# Patient Record
(demographics unavailable — no encounter records)

---

## 2024-11-27 NOTE — PHYSICAL EXAM
[Normal Appearance] : normal appearance [Edema] : no peripheral edema [] : no respiratory distress [Abdomen Soft] : soft [Abdomen Tenderness] : non-tender [Abdomen Mass (___ Cm)] : no abdominal mass palpated [Costovertebral Angle Tenderness] : no ~M costovertebral angle tenderness [Urethral Meatus] : meatus normal [Penis Abnormality] : normal uncircumcised penis [Urinary Bladder Findings] : the bladder was normal on palpation [Scrotum] : the scrotum was normal [Epididymis] : the epididymides were normal [Testes Tenderness] : no tenderness of the testes [Testes Mass (___cm)] : there were no testicular masses [Normal Station and Gait] : the gait and station were normal for the patient's age [Skin Turgor] : supple [No Focal Deficits] : no focal deficits [Oriented To Time, Place, And Person] : oriented to person, place, and time [Affect] : the affect was normal [Mood] : the mood was normal [de-identified] : Lare and stable RIH which empties in the supine position.

## 2024-11-27 NOTE — HISTORY OF PRESENT ILLNESS
[FreeTextEntry1] : 83 YO M seen  8/24/2023 as NPT. Patient seen 5/8/2028 ([facility]) for BPH on Finasteride. PSA 0.1. He has minimal LUTS except for nocturia x 3. He remains on finasteride 5 mg daily. He noted grossly bloody urine on one occasion one month ago. He denies any dysuria, kidney stone disease or UTIs. UA:  moderate WBC, PRO 30, Uro 2 IPSS 5 10+ pack year history of smoking but quit in 1999. Patient is also on amlodipine, metoprolol, Losartan for HTN, Metformin for DM, Lipitor for cholesterol. Allergic to PCN. Pelvic US: Bladder wall: was of normal thickness without diverticula Post void bladder residual: 29 cc Prostate gland measured: 3.5 cm x 2.6 cm x 3.6 m Prostate gland volume: 17 cc Impressions: Technically difficult study due to patient body habitus and sub optimally filled bladder, otherwise exam is unremarkable. We discussed the patient's history of Gross Hematuria and past history of smoking. I sent urine today for C+S and cytology, blood for PSA and BMP. If these confirm normal real function, then we will proceed with CT A+P, C+/C-, followed by cystoscopy in the office with NO inhalation. As for the RIH, it is asymptomatic. We discussed signs and symptoms of incarceration and how to proceed emergently if they should occur.  PSA 0.1 (equivalent to 0.2 on Finasteride) C+S positive treated with Bactrim DS x 5 days Cytology negative CT scan 9/13/2023: No stones, no hydronephrosis, stable cysts, stable 1 cm exophytic solid left renal mass, recommend MRI 1 year, large RIH.  Patient seen 10/5/2023 for cystoscopy. He denies any interval gross hematuria. UA small WBC only. Cystoscopy today: No bladder lesions, small, non obstructing prostate, multiple urethral strictures, narrowest in bulb 14Fr dilated with the scope to 18 Fr.  Cystoscopy today did not reveal any bleeding sites in the lower urinary tract. The only abnormality were multiple strictures in the anterior urethra starting from the bulb and proceeding distally. The most narrow 1 measured approximately 14 Syriac in the bulb and was dilated gently with the scope to approximately 18 Syriac. Patient tolerated this well. We will have the patient come back in 6 weeks to perform uroflowmetry and PVR. In meanwhile he will stay on the finasteride. UA today showed small white blood cells negative nitrites and urine culture was sent to follow-up on his previously treated bacterial infection. At patient's request we also discussed again his large right inguinal hernia confirmed on his recent CT scan. He is concerned about potential incarceration and I recommended that he follow-up with his PCP Dr. Bryce Garcia office for referral to the appropriate surgeon for evaluation and possible surgical repair. We again reviewed the signs of incarceration and how to proceed in the unlikely event that that should occur before he is evaluated and treated.  Patient seen TODAY 11/27/2024 to reassess. He told me that he urinating well with stable nocturia x 3 on finasteride only. He gets this through his PCP. He denies any gross hematuria. He also C/O intermittent pain in the testes and lower abdomen which is fleeting and occurs once every 2 weeks. He denies any N/V or fever. He also C/O ED with difficulty achieving a full erection. In the past he has used Viagra 100+ mg with success. He is presently also on multi drug therapy for his HTN.  No new medication allergies, only PCN as previously noted.  UA large RBC IPSS 4 Uroflow inaccurate: Vol 58ml, PVR 0 ml.

## 2024-11-27 NOTE — HISTORY OF PRESENT ILLNESS
[FreeTextEntry1] : 83 YO M seen  8/24/2023 as NPT. Patient seen 5/8/2028 ([facility]) for BPH on Finasteride. PSA 0.1. He has minimal LUTS except for nocturia x 3. He remains on finasteride 5 mg daily. He noted grossly bloody urine on one occasion one month ago. He denies any dysuria, kidney stone disease or UTIs. UA:  moderate WBC, PRO 30, Uro 2 IPSS 5 10+ pack year history of smoking but quit in 1999. Patient is also on amlodipine, metoprolol, Losartan for HTN, Metformin for DM, Lipitor for cholesterol. Allergic to PCN. Pelvic US: Bladder wall: was of normal thickness without diverticula Post void bladder residual: 29 cc Prostate gland measured: 3.5 cm x 2.6 cm x 3.6 m Prostate gland volume: 17 cc Impressions: Technically difficult study due to patient body habitus and sub optimally filled bladder, otherwise exam is unremarkable. We discussed the patient's history of Gross Hematuria and past history of smoking. I sent urine today for C+S and cytology, blood for PSA and BMP. If these confirm normal real function, then we will proceed with CT A+P, C+/C-, followed by cystoscopy in the office with NO inhalation. As for the RIH, it is asymptomatic. We discussed signs and symptoms of incarceration and how to proceed emergently if they should occur.  PSA 0.1 (equivalent to 0.2 on Finasteride) C+S positive treated with Bactrim DS x 5 days Cytology negative CT scan 9/13/2023: No stones, no hydronephrosis, stable cysts, stable 1 cm exophytic solid left renal mass, recommend MRI 1 year, large RIH.  Patient seen 10/5/2023 for cystoscopy. He denies any interval gross hematuria. UA small WBC only. Cystoscopy today: No bladder lesions, small, non obstructing prostate, multiple urethral strictures, narrowest in bulb 14Fr dilated with the scope to 18 Fr.  Cystoscopy today did not reveal any bleeding sites in the lower urinary tract. The only abnormality were multiple strictures in the anterior urethra starting from the bulb and proceeding distally. The most narrow 1 measured approximately 14 Icelandic in the bulb and was dilated gently with the scope to approximately 18 Icelandic. Patient tolerated this well. We will have the patient come back in 6 weeks to perform uroflowmetry and PVR. In meanwhile he will stay on the finasteride. UA today showed small white blood cells negative nitrites and urine culture was sent to follow-up on his previously treated bacterial infection. At patient's request we also discussed again his large right inguinal hernia confirmed on his recent CT scan. He is concerned about potential incarceration and I recommended that he follow-up with his PCP Dr. Bryce Garcia office for referral to the appropriate surgeon for evaluation and possible surgical repair. We again reviewed the signs of incarceration and how to proceed in the unlikely event that that should occur before he is evaluated and treated.  Patient seen TODAY 11/27/2024 to reassess. He told me that he urinating well with stable nocturia x 3 on finasteride only. He gets this through his PCP. He denies any gross hematuria. He also C/O intermittent pain in the testes and lower abdomen which is fleeting and occurs once every 2 weeks. He denies any N/V or fever. He also C/O ED with difficulty achieving a full erection. In the past he has used Viagra 100+ mg with success. He is presently also on multi drug therapy for his HTN.  No new medication allergies, only PCN as previously noted.  UA large RBC IPSS 4 Uroflow inaccurate: Vol 58ml, PVR 0 ml.

## 2024-11-27 NOTE — ASSESSMENT
[FreeTextEntry1] : Evaluation today consists of stable lower urinary tract symptoms which are tolerable on finasteride.  I recommended he continue on this medication.  As for the previous gross hematuria, this has not recurred, but he does continue to have microscopic hematuria.  Urine sent for culture and cytology.  If these test remain negative, then follow-up on next visit.  We made plans for him to follow-up with me in 6 months with a repeat uroflow and PVR.  As for the testicular pain, physical examination today was normal and did not elicit the testicular pain.  We will continue to observe this at present.  As for the right inguinal hernia, this is a known problem, and we again discussed evaluation for possible treatment.  I also discussed with him signs and symptoms of incarceration and what he should do if they occur.  He is aware.  Since this hernia is minimally symptomatic at this time, he will continue to observe.  As for the ED, we discussed the use of sildenafil citrate starting at 100 mg per dose along with the indications risks chances for success and possible side effects.  He would like to try the medicine, and this was ordered for him through [facility] at his request. Rufina Dillon MD

## 2024-11-27 NOTE — PHYSICAL EXAM
[Normal Appearance] : normal appearance [Edema] : no peripheral edema [] : no respiratory distress [Abdomen Soft] : soft [Abdomen Tenderness] : non-tender [Abdomen Mass (___ Cm)] : no abdominal mass palpated [Costovertebral Angle Tenderness] : no ~M costovertebral angle tenderness [Urethral Meatus] : meatus normal [Penis Abnormality] : normal uncircumcised penis [Urinary Bladder Findings] : the bladder was normal on palpation [Scrotum] : the scrotum was normal [Epididymis] : the epididymides were normal [Testes Tenderness] : no tenderness of the testes [Testes Mass (___cm)] : there were no testicular masses [Normal Station and Gait] : the gait and station were normal for the patient's age [Skin Turgor] : supple [No Focal Deficits] : no focal deficits [Oriented To Time, Place, And Person] : oriented to person, place, and time [Affect] : the affect was normal [Mood] : the mood was normal [de-identified] : Lare and stable RIH which empties in the supine position.

## 2025-06-11 NOTE — HISTORY OF PRESENT ILLNESS
[FreeTextEntry1] : 83 YO M seen  8/24/2023 as NPT. Patient seen 5/8/2028 ([facility]) for BPH on Finasteride. PSA 0.1. He has minimal LUTS except for nocturia x 3. He remains on finasteride 5 mg daily. He noted grossly bloody urine on one occasion one month ago. He denies any dysuria, kidney stone disease or UTIs. UA:  moderate WBC, PRO 30, Uro 2 IPSS 5 10+ pack year history of smoking but quit in 1999. Patient is also on amlodipine, metoprolol, Losartan for HTN, Metformin for DM, Lipitor for cholesterol. Allergic to PCN. Pelvic US: Bladder wall: was of normal thickness without diverticula Post void bladder residual: 29 cc Prostate gland measured: 3.5 cm x 2.6 cm x 3.6 m Prostate gland volume: 17 cc Impressions: Technically difficult study due to patient body habitus and sub optimally filled bladder, otherwise exam is unremarkable. We discussed the patient's history of Gross Hematuria and past history of smoking. I sent urine today for C+S and cytology, blood for PSA and BMP. If these confirm normal real function, then we will proceed with CT A+P, C+/C-, followed by cystoscopy in the office with NO inhalation. As for the RIH, it is asymptomatic. We discussed signs and symptoms of incarceration and how to proceed emergently if they should occur.  PSA 0.1 (equivalent to 0.2 on Finasteride) C+S positive treated with Bactrim DS x 5 days Cytology negative CT scan 9/13/2023: No stones, no hydronephrosis, stable cysts, stable 1 cm exophytic solid left renal mass, recommend MRI 1 year, large RIH.  Patient seen 10/5/2023 for cystoscopy. He denies any interval gross hematuria. UA small WBC only. Cystoscopy today: No bladder lesions, small, non obstructing prostate, multiple urethral strictures, narrowest in bulb 14Fr dilated with the scope to 18 Fr.  Cystoscopy today did not reveal any bleeding sites in the lower urinary tract. The only abnormality were multiple strictures in the anterior urethra starting from the bulb and proceeding distally. The most narrow 1 measured approximately 14 Welsh in the bulb and was dilated gently with the scope to approximately 18 Welsh. Patient tolerated this well. We will have the patient come back in 6 weeks to perform uroflowmetry and PVR. In meanwhile he will stay on the finasteride. UA today showed small white blood cells negative nitrites and urine culture was sent to follow-up on his previously treated bacterial infection. At patient's request we also discussed again his large right inguinal hernia confirmed on his recent CT scan. He is concerned about potential incarceration and I recommended that he follow-up with his PCP Dr. Bryce Garcia office for referral to the appropriate surgeon for evaluation and possible surgical repair. We again reviewed the signs of incarceration and how to proceed in the unlikely event that that should occur before he is evaluated and treated.  Patient seen 11/27/2024 to reassess. He told me that he urinating well with stable nocturia x 3 on finasteride only. He gets this through his PCP. He denies any gross hematuria. He also C/O intermittent pain in the testes and lower abdomen which is fleeting and occurs once every 2 weeks. He denies any N/V or fever. He also C/O ED with difficulty achieving a full erection. In the past he has used Viagra 100+ mg with success. He is presently also on multi drug therapy for his HTN.  No new medication allergies, only PCN as previously noted.  UA large RBC IPSS 4 Uroflow inaccurate: Vol 58ml, PVR 0 ml. Evaluation today consists of stable lower urinary tract symptoms which are tolerable on finasteride. I recommended he continue on this medication. As for the previous gross hematuria, this has not recurred, but he does continue to have microscopic hematuria. Urine sent for culture and cytology. If these test remain negative, then follow-up on next visit. We made plans for him to follow-up with me in 6 months with a repeat uroflow and PVR. As for the testicular pain, physical examination today was normal and did not elicit the testicular pain. We will continue to observe this at present. As for the right inguinal hernia, this is a known problem, and we again discussed evaluation for possible treatment. I also discussed with him signs and symptoms of incarceration and what he should do if they occur. He is aware. Since this hernia is minimally symptomatic at this time, he will continue to observe. As for the ED, we discussed the use of sildenafil citrate starting at 100 mg per dose along with the indications risks chances for success and possible side effects. He would like to try the medicine, and this was ordered for him through [facility] at his request.  C+S, cytology both negative. Results reviewed with patient by phone, FU as planned 6/5/2025.   Patient seen TODAY 6/11/2025 to reassess. He told me that he is urinating well but with frequency. No gross hematuria, no dysuria. He is not interested in medication for this. As for the ED, he tried the sildenafil a few times but found it not to be effective in that he remained unable to achieve rigidity. He is interested in trying another medication for this. UA Large WBC (chronic) IPSS 9 Uroflow good: Vol 176, V Max 12ml/s, V Ave 6 ml/s PVR 0ml.

## 2025-06-11 NOTE — ASSESSMENT
[FreeTextEntry1] : Evaluation today is consistent with stable lower urinary tract symptoms in the face of chronic polyuria.  Cultures have been negative in the past and repeat culture was sent today. We will be judicious in treating any positive culture based upon his paucity of symptoms.  We discussed his lower urinary tract symptoms in the face of a good uroflowmetry and 0 residual bladder volume.  It is unlikely that he would benefit from alpha-blocker medication in this regard.  And we will delay therapy for the symptoms at the present time.  As far as ED, I discussed other options including the attempt to improve his erections with tadalafil 20 mg as needed.  I reviewed the indications, risk, alternatives, and chance for success, along with the appropriate optimal way to take this medication with the patient and it was ordered for him through [facility].  He will be due for follow-up in 6 months and we made plans for him to follow-up with Dr. Lee hill in my office at that time since I will be retired as of 7/1/2025.  Rufina Dillon MD

## 2025-06-11 NOTE — HISTORY OF PRESENT ILLNESS
[FreeTextEntry1] : 81 YO M seen  8/24/2023 as NPT. Patient seen 5/8/2028 ([facility]) for BPH on Finasteride. PSA 0.1. He has minimal LUTS except for nocturia x 3. He remains on finasteride 5 mg daily. He noted grossly bloody urine on one occasion one month ago. He denies any dysuria, kidney stone disease or UTIs. UA:  moderate WBC, PRO 30, Uro 2 IPSS 5 10+ pack year history of smoking but quit in 1999. Patient is also on amlodipine, metoprolol, Losartan for HTN, Metformin for DM, Lipitor for cholesterol. Allergic to PCN. Pelvic US: Bladder wall: was of normal thickness without diverticula Post void bladder residual: 29 cc Prostate gland measured: 3.5 cm x 2.6 cm x 3.6 m Prostate gland volume: 17 cc Impressions: Technically difficult study due to patient body habitus and sub optimally filled bladder, otherwise exam is unremarkable. We discussed the patient's history of Gross Hematuria and past history of smoking. I sent urine today for C+S and cytology, blood for PSA and BMP. If these confirm normal real function, then we will proceed with CT A+P, C+/C-, followed by cystoscopy in the office with NO inhalation. As for the RIH, it is asymptomatic. We discussed signs and symptoms of incarceration and how to proceed emergently if they should occur.  PSA 0.1 (equivalent to 0.2 on Finasteride) C+S positive treated with Bactrim DS x 5 days Cytology negative CT scan 9/13/2023: No stones, no hydronephrosis, stable cysts, stable 1 cm exophytic solid left renal mass, recommend MRI 1 year, large RIH.  Patient seen 10/5/2023 for cystoscopy. He denies any interval gross hematuria. UA small WBC only. Cystoscopy today: No bladder lesions, small, non obstructing prostate, multiple urethral strictures, narrowest in bulb 14Fr dilated with the scope to 18 Fr.  Cystoscopy today did not reveal any bleeding sites in the lower urinary tract. The only abnormality were multiple strictures in the anterior urethra starting from the bulb and proceeding distally. The most narrow 1 measured approximately 14 Estonian in the bulb and was dilated gently with the scope to approximately 18 Estonian. Patient tolerated this well. We will have the patient come back in 6 weeks to perform uroflowmetry and PVR. In meanwhile he will stay on the finasteride. UA today showed small white blood cells negative nitrites and urine culture was sent to follow-up on his previously treated bacterial infection. At patient's request we also discussed again his large right inguinal hernia confirmed on his recent CT scan. He is concerned about potential incarceration and I recommended that he follow-up with his PCP Dr. Bryce Garcia office for referral to the appropriate surgeon for evaluation and possible surgical repair. We again reviewed the signs of incarceration and how to proceed in the unlikely event that that should occur before he is evaluated and treated.  Patient seen 11/27/2024 to reassess. He told me that he urinating well with stable nocturia x 3 on finasteride only. He gets this through his PCP. He denies any gross hematuria. He also C/O intermittent pain in the testes and lower abdomen which is fleeting and occurs once every 2 weeks. He denies any N/V or fever. He also C/O ED with difficulty achieving a full erection. In the past he has used Viagra 100+ mg with success. He is presently also on multi drug therapy for his HTN.  No new medication allergies, only PCN as previously noted.  UA large RBC IPSS 4 Uroflow inaccurate: Vol 58ml, PVR 0 ml. Evaluation today consists of stable lower urinary tract symptoms which are tolerable on finasteride. I recommended he continue on this medication. As for the previous gross hematuria, this has not recurred, but he does continue to have microscopic hematuria. Urine sent for culture and cytology. If these test remain negative, then follow-up on next visit. We made plans for him to follow-up with me in 6 months with a repeat uroflow and PVR. As for the testicular pain, physical examination today was normal and did not elicit the testicular pain. We will continue to observe this at present. As for the right inguinal hernia, this is a known problem, and we again discussed evaluation for possible treatment. I also discussed with him signs and symptoms of incarceration and what he should do if they occur. He is aware. Since this hernia is minimally symptomatic at this time, he will continue to observe. As for the ED, we discussed the use of sildenafil citrate starting at 100 mg per dose along with the indications risks chances for success and possible side effects. He would like to try the medicine, and this was ordered for him through [facility] at his request.  C+S, cytology both negative. Results reviewed with patient by phone, FU as planned 6/5/2025.   Patient seen TODAY 6/11/2025 to reassess. He told me that he is urinating well but with frequency. No gross hematuria, no dysuria. He is not interested in medication for this. As for the ED, he tried the sildenafil a few times but found it not to be effective in that he remained unable to achieve rigidity. He is interested in trying another medication for this. UA Large WBC (chronic) IPSS 9 Uroflow good: Vol 176, V Max 12ml/s, V Ave 6 ml/s PVR 0ml.

## 2025-06-11 NOTE — PHYSICAL EXAM
[Normal Appearance] : normal appearance [Edema] : no peripheral edema [] : no respiratory distress [Abdomen Soft] : soft [No Focal Deficits] : no focal deficits [Oriented To Time, Place, And Person] : oriented to person, place, and time [Affect] : the affect was normal [Mood] : the mood was normal